# Patient Record
(demographics unavailable — no encounter records)

---

## 2025-01-17 NOTE — ASSESSMENT
[FreeTextEntry1] : Patient comes in after injuring his left thumb about 1 week ago.  He was hanging pictures in the last picture he hung hands it up hitting his thumb with a hammer.  For few days it did not bother him but after a while it started causing him pain and swelling.  He went to an urgent care facility where they took x-rays.  He then went to the emergency room where he had a friend drain his nail.  He says pus came from the area.   He was given doxycycline.  He was also told that he had pus in the fingertip but the emergency room doctor did not feel comfortable doing anything about that.  He is now here for evaluation in hopes that this will be drained.  Left thumb: Ecchymosis under the tip of the finger subungual hematoma, trephination and nail, fluctuance at tip of finger with ecchymosis, tender palpation along the tip of the finger, full range of motion of thumb, neurovascular intact  X-rays of the thumb 3 views are negative  Status post nailbed injury now with a diego.  I discussed this with the patient.  He is interested in having it drained.  I gave the patient a digital block with lidocaine.  After cleaning the tip of the finger an 11 blade was used to make an incision at the tip of the finger where there was the area of maximal purulence and an incision of about 2 mm was made just volar to the nail fold.  A significant amount of pus was evacuated until there was no more pus.  Packing was placed.  The patient will start either tonight or tomorrow with warm soaks with warm water twice a day and squeezing the area so more pus does not accumulate.  I am going to change his medications to take Augmentin as I believe this is better for his injury.  He will return next week for evaluation of the finger.  If he has any problems he will let us know.

## 2025-01-24 NOTE — ASSESSMENT
[FreeTextEntry1] : Patient comes in for follow-up of his feeling of his left thumb.  He is doing a lot better.  He has been doing soaks.  He is on his last day of antibiotics.  He does not have other complaints today.  Left thumb: Mild purulence under the distal nail fold, no erythema, neurovascular intact  Patient has a feeling to the left thumb.  He is doing a lot better.  Any leftover pus was removed in the office today.  I recommend that patient goes home and cuts his nail as far down as possible as it seems as though the pus is coming from right underneath the interval between the nail and skin and hopefully this will allow for more drainage.  If he finds he is unable to do so he will return and I will remove at least part of his nail.  I will have him follow-up in 2 weeks.  If he notes that he is having difficulty with anything I will see him on Monday.